# Patient Record
Sex: FEMALE | Race: WHITE
[De-identification: names, ages, dates, MRNs, and addresses within clinical notes are randomized per-mention and may not be internally consistent; named-entity substitution may affect disease eponyms.]

---

## 2022-11-28 LAB
BUN BLD-MCNC: 16 MG/DL (ref 7–18)
GLUCOSE SERPLBLD-MCNC: 96 MG/DL (ref 74–106)
HCT VFR BLD CALC: 38.6 % (ref 36–45)
INR BLD: 1.1
LYMPHOCYTES # SPEC AUTO: 1.4 K/UL (ref 0.7–4.9)
MCV RBC: 95.6 FL (ref 80–100)
PMV BLD: 8.1 FL (ref 7.6–11.3)
POTASSIUM SERPL-SCNC: 3.6 MMOL/L (ref 3.5–5.1)
RBC # BLD: 4.04 M/UL (ref 3.86–4.86)

## 2022-11-29 NOTE — EKG
Test Date:    2022-11-28               Test Time:    14:24:20

Technician:   JUAN                                     

                                                     

MEASUREMENT RESULTS:                                       

Intervals:                                           

Rate:         59                                     

CO:           162                                    

QRSD:         90                                     

QT:           400                                    

QTc:          396                                    

Axis:                                                

P:            10                                     

CO:           162                                    

QRS:          0                                      

T:            22                                     

                                                     

INTERPRETIVE STATEMENTS:                                       

                                                     

Sinus bradycardia

Otherwise normal ECG

No previous ECG available for comparison



Electronically Signed On 11-29-22 14:12:32 CST by Kirill Heller

## 2022-12-01 ENCOUNTER — HOSPITAL ENCOUNTER (OUTPATIENT)
Dept: HOSPITAL 97 - CCL | Age: 67
Discharge: HOME | End: 2022-12-01
Attending: INTERNAL MEDICINE
Payer: COMMERCIAL

## 2022-12-01 VITALS — OXYGEN SATURATION: 100 %

## 2022-12-01 VITALS — TEMPERATURE: 97.8 F

## 2022-12-01 VITALS — SYSTOLIC BLOOD PRESSURE: 124 MMHG | DIASTOLIC BLOOD PRESSURE: 64 MMHG

## 2022-12-01 DIAGNOSIS — I10: ICD-10-CM

## 2022-12-01 DIAGNOSIS — I20.0: Primary | ICD-10-CM

## 2022-12-01 DIAGNOSIS — Z82.49: ICD-10-CM

## 2022-12-01 DIAGNOSIS — R09.89: ICD-10-CM

## 2022-12-01 DIAGNOSIS — R00.1: ICD-10-CM

## 2022-12-01 DIAGNOSIS — I70.212: ICD-10-CM

## 2022-12-01 DIAGNOSIS — F17.210: ICD-10-CM

## 2022-12-01 PROCEDURE — 93005 ELECTROCARDIOGRAM TRACING: CPT

## 2022-12-01 PROCEDURE — 93458 L HRT ARTERY/VENTRICLE ANGIO: CPT

## 2022-12-01 PROCEDURE — 85730 THROMBOPLASTIN TIME PARTIAL: CPT

## 2022-12-01 PROCEDURE — 85025 COMPLETE CBC W/AUTO DIFF WBC: CPT

## 2022-12-01 PROCEDURE — 80048 BASIC METABOLIC PNL TOTAL CA: CPT

## 2022-12-01 PROCEDURE — 36415 COLL VENOUS BLD VENIPUNCTURE: CPT

## 2022-12-01 PROCEDURE — 85610 PROTHROMBIN TIME: CPT

## 2022-12-01 NOTE — OP
Date of Procedure:  12/01/2022



Surgeon:  KRISTAL PRATHER



Procedures Performed:  

1.Selective coronary angiogram.

2.Left heart catheterization.



Indication:  Unstable angina.



Access:  Right radial artery 6-Canadian closed with TR band.



Complications:  None.



Estimated Blood Loss:  Bleeding less than 10 mL.



Anesthesia:  Total sedation time was 20 minutes, used fentanyl and Versed.



Description Of Procedure:  After risks, benefits, and alternatives were explained, the patient agreed
 to the procedure and signed informed consent.  Patient was brought into the cardiac catheterization 
laboratory and prepped and draped in usual sterile fashion.  Then I accessed right radial artery usin
g pediatric micropuncture kit, placed 6-Canadian Slender sheath and took a 5-Canadian Tiger 4 catheter in
to the aortic root, engaged the left main, then exchanged for a 6-Canadian JR4 catheter, engaged the RC
A and then the catheter was pushed over the wire into the LV, measured the LVEDP and pullback did not
 record any gradient.  I then removed the catheter and the sheath and placed TR band with good hemost
asis.



Findings:  

1.Left main:  Large and normal.

2.LAD:  Large and normal.  Normal diagonal branches.

3.Left circumflex is of moderate size and normal.  Normal OM branches.

4.RCA is dominant and normal.

5.Elevated LVEDP at 14 mmHg.



Conclusion:  

1.Normal coronary arteries.

2.Mildly elevated LVEDP.



Plan:  Medical management.





/CHENG

DD:  12/01/2022 13:25:42Voice ID:  [id number]

DT:  12/01/2022 23:24:51Report ID:  [id number]